# Patient Record
Sex: MALE | Race: AMERICAN INDIAN OR ALASKA NATIVE | Employment: STUDENT | URBAN - METROPOLITAN AREA
[De-identification: names, ages, dates, MRNs, and addresses within clinical notes are randomized per-mention and may not be internally consistent; named-entity substitution may affect disease eponyms.]

---

## 2024-01-30 ENCOUNTER — HOSPITAL ENCOUNTER (EMERGENCY)
Age: 24
Discharge: HOME | End: 2024-01-30

## 2024-01-30 ENCOUNTER — APPOINTMENT (OUTPATIENT)
Dept: GENERAL RADIOLOGY | Age: 24
End: 2024-01-30

## 2024-01-30 VITALS
BODY MASS INDEX: 21.87 KG/M2 | RESPIRATION RATE: 18 BRPM | DIASTOLIC BLOOD PRESSURE: 58 MMHG | WEIGHT: 165 LBS | HEART RATE: 87 BPM | TEMPERATURE: 98.1 F | HEIGHT: 73 IN | SYSTOLIC BLOOD PRESSURE: 111 MMHG | OXYGEN SATURATION: 99 %

## 2024-01-30 DIAGNOSIS — S63.501A SPRAIN OF RIGHT WRIST, INITIAL ENCOUNTER: Primary | ICD-10-CM

## 2024-01-30 PROCEDURE — 99283 EMERGENCY DEPT VISIT LOW MDM: CPT

## 2024-01-30 PROCEDURE — 73110 X-RAY EXAM OF WRIST: CPT

## 2024-01-30 NOTE — ED NOTES
Discharge instructions reviewed , including diagnosis, medications, follow up appointments, home care, and also when to call 911.  All discharge instructions questions answered.         Pt left ED ambulatory

## 2024-01-30 NOTE — ED PROVIDER NOTES
Independent [facility]O Visit.       [facility] EMERGENCY DEPARTMENT  ED  Encounter Note  Admit Date/RoomTime: No admission date for patient encounter.  ED Room: Room/bed info not found  NAME: Ramsey Sherman  : 2000  MRN: [id number]  PCP: No primary care provider on file.    CHIEF COMPLAINT     Wrist Pain (Right wrist pain, reports \"I put to much pressure on it\")    HISTORY OF PRESENT ILLNESS        Ramsey Sherman is a 23 y.o. male who presents to the ED via private vehicle with complaint of wrist pain.  States he put too much pressure on it concerned he may have broken it.  Primarily on the dorsal aspect.  No associated swelling.  No deformity.  No open areas  REVIEW OF SYSTEMS     Pertinent positives and negatives are stated within HPI, all other systems reviewed and are negative.    Past Medical History:  has no past medical history on file.  Surgical History:  has no past surgical history on file.  Social History:    Family History: family history is not on file.   Allergies: Patient has no known allergies.  CURRENT MEDICATIONS       Previous Medications    No medications on file       SCREENINGS     [place] Coma Scale  Eye Opening: Spontaneous  Best Verbal Response: Oriented  Best Motor Response: Obeys commands  [place] Coma Scale Score: 15         CIWA Assessment  BP: (!) 111/58  Pulse: 87       PHYSICAL EXAM   Oxygen Saturation Interpretation: Normal on room air analysis.        ED Triage Vitals   BP Temp Temp Source Pulse Respirations SpO2 Height Weight - Scale   24 0123 24 0118 24 0118 24 0118 24 0123 24 0118 24 0123 24 0123   (!) 111/58 98.1 °F (36.7 °C) Temporal 87 18 100 % 1.854 m (6' 1\") 74.8 kg (165 lb)         Physical Exam  General: Awake alert and oriented.  Well-appearing.  Nontoxic.  HEENT: Normocephalic, atraumatic.  Pupils equal  Neck: Normal range of motion  Cardiac: Regular rate  Respiratory: Respirations